# Patient Record
Sex: MALE | Race: OTHER | NOT HISPANIC OR LATINO | Employment: FULL TIME | URBAN - METROPOLITAN AREA
[De-identification: names, ages, dates, MRNs, and addresses within clinical notes are randomized per-mention and may not be internally consistent; named-entity substitution may affect disease eponyms.]

---

## 2021-03-25 ENCOUNTER — OCCMED (OUTPATIENT)
Dept: URGENT CARE | Facility: MEDICAL CENTER | Age: 46
End: 2021-03-25
Payer: OTHER MISCELLANEOUS
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
## 2021-03-25 PROCEDURE — 99283 EMERGENCY DEPT VISIT LOW MDM: CPT | Performed by: FAMILY MEDICINE

## 2021-03-25 PROCEDURE — G0382 LEV 3 HOSP TYPE B ED VISIT: HCPCS | Performed by: FAMILY MEDICINE

## 2021-03-26 NOTE — PROGRESS NOTES
[facility]        NAME: Liza Donald is a 48 y o  male  : 1975    MRN: [id number]  DATE: 2021  TIME: 9:24 PM    Assessment and Plan   No primary diagnosis found  No diagnosis found  Patient Instructions       Follow up with PCP in 3-5 days  Proceed to  ER if symptoms worsen  Chief Complaint   No chief complaint on file  History of Present Illness       HPI    Review of Systems   Review of Systems      Current Medications     No current outpatient medications on file  Current Allergies     Allergies as of 2021    (Not on File)            The following portions of the patient's history were reviewed and updated as appropriate: allergies, current medications, past family history, past medical history, past social history, past surgical history and problem list      No past medical history on file  No past surgical history on file  No family history on file  Medications have been verified  Objective   There were no vitals taken for this visit  No LMP for male patient         Physical Exam     Physical Exam

## 2021-04-21 ENCOUNTER — APPOINTMENT (OUTPATIENT)
Dept: RADIOLOGY | Facility: MEDICAL CENTER | Age: 46
End: 2021-04-21
Payer: OTHER MISCELLANEOUS

## 2021-04-21 VITALS
HEIGHT: 65 IN | DIASTOLIC BLOOD PRESSURE: 75 MMHG | WEIGHT: 167 LBS | HEART RATE: 64 BPM | BODY MASS INDEX: 27.82 KG/M2 | SYSTOLIC BLOOD PRESSURE: 119 MMHG

## 2021-04-21 DIAGNOSIS — T14.8XXA CRUSH INJURY: Primary | ICD-10-CM

## 2021-04-21 DIAGNOSIS — S61.209A OPEN DISLOCATION OF PROXIMAL INTERPHALANGEAL (PIP) JOINT OF FINGER: ICD-10-CM

## 2021-04-21 DIAGNOSIS — S61.214A LACERATION OF RIGHT RING FINGER WITHOUT FOREIGN BODY WITHOUT DAMAGE TO NAIL, INITIAL ENCOUNTER: ICD-10-CM

## 2021-04-21 DIAGNOSIS — S61.210A LACERATION OF RIGHT INDEX FINGER WITHOUT FOREIGN BODY WITHOUT DAMAGE TO NAIL, INITIAL ENCOUNTER: ICD-10-CM

## 2021-04-21 DIAGNOSIS — S63.289A OPEN DISLOCATION OF PROXIMAL INTERPHALANGEAL (PIP) JOINT OF FINGER: ICD-10-CM

## 2021-04-21 DIAGNOSIS — T14.8XXA CRUSH INJURY: ICD-10-CM

## 2021-04-21 PROCEDURE — 73140 X-RAY EXAM OF FINGER(S): CPT

## 2021-04-21 PROCEDURE — 99204 OFFICE O/P NEW MOD 45 MIN: CPT | Performed by: ORTHOPAEDIC SURGERY

## 2021-04-21 PROCEDURE — 64450 NJX AA&/STRD OTHER PN/BRANCH: CPT | Performed by: ORTHOPAEDIC SURGERY

## 2021-04-21 NOTE — PROGRESS NOTES
Chief Complaint     Right index, middle, and ring finger lacerations     History of Present Illness     Nahun Stack is a 48 y o  right hand dominant male who presents to the today for an evaluation of his right hand  This is a worker's compensation case  Patient works as a  and sustained injury on 03/17/2021 when his hand got caught in a metal roller  He was previously seen in the ED at [facility] where he was treated for a right middle finger PIP open dislocation as well as a laceration to the palmar aspect of his ring finger the PIP joint and a laceration to his index finger at palmar aspect at the distal phalanx  Patient was previously supposed to follow-up with a hand surgeon in an outside at work was unable to do so due to insurance  He has continued to follow-up with occupational medicine who has been treating him for his wounds  He did originally have sutures in but these were removed  He did  Receive 1 g of Ancef in the ED and was discharged with 1 week of Keflex which he has completed  Has been taking naproxen for pain  Patient states he has been dressing the wounds daily and using Neosporin about the lacerations  Patient notes diminished sensation to the distal tip of the middle finger as well as inability to flex his middle finger  He has not returned to work at this time  Patient previously sustained injury to the right hand many years with a laceration to the palm  He states he had no functional deficits from this injury  History reviewed  No pertinent past medical history  History reviewed  No pertinent surgical history  No Known Allergies    No current outpatient medications on file prior to visit  No current facility-administered medications on file prior to visit          Social History     Tobacco Use    Smoking status: Never Smoker    Smokeless tobacco: Never Used   Substance Use Topics    Alcohol use: Not Currently    Drug use: Not Currently       History reviewed  No pertinent family history  Review of Systems     As stated in the HPI  All other systems were reviewed and are negative  Physical Exam     /75   Pulse 64   Ht 5' 5" (1 651 m)   Wt 75 8 kg (167 lb)   BMI 27 79 kg/m²     GENERAL: This is a well-developed, well-nourished, age-appropriate patient in no acute distress  The patient is alert and oriented x3  Pleasant and cooperative  Eyes: Anicteric sclerae  Extraocular movements appear intact  HENT: Nares are patent with no drainage  Lungs: There is equal chest rise on inspection  Breathing is non-labored with no audible wheezing  Cardiovascular: No cyanosis  No upper extremity lymphadema  Skin: Skin is warm to touch  No obvious skin lesions or rashes other than described below  Neurologic: No ataxia  Psychiatric: Mood and affect are appropriate  Right hand   Well-healed laceration noted to the distal phalanx of the palmar aspect of the index finger   Laceration noted at the right finger at the palmar aspect just proximal to the PIP joint  Laceration noted at the index finger palmar aspect in line with the PIP joint  Old healed laceration inline with the index and middle at metcarpal heads at plamar aspect   No erythema, drainage, or ecchymosis noted  FDP and FDS intact to the index and ring   FDP intact to the middle, FDS intact with excursion after  Digital nerve block  No sensation to the radial middle finger, normal sensation to the ulnar side middle finger   Brisk capillary refill noted to all digits    Data Review     Labs:  None today     Electrodiagnostic Testing:  None today     Imaging:   x-rays of the right index, middle, and ring finger obtained on 04/21/2021 were personally reviewed by me in the office and demonstrate no acute fracture or osseous abnormality to the index or ring finger    There is slight dorsal  Angulation at the PIP joint of the middle finger,  But appropriate joint congruity and no fracture  Assessment and Plan      Diagnoses and all orders for this visit:    Crush injury  -     XR finger right third digit-middle; Future  -     XR finger right second digit-index; Future  -     XR finger right fourth digit-ring; Future  -     Ambulatory referral to PT/OT hand therapy; Future    Open dislocation of proximal interphalangeal (PIP) joint of finger  -     Nerve block  -     Ambulatory referral to PT/OT hand therapy; Future    Laceration of right index finger without foreign body without damage to nail, initial encounter    Laceration of right ring finger without foreign body without damage to nail, initial encounter    Other orders  -     Cancel: Hand/upper extremity injection          68-year-old male with open PIP joint dislocation right middle finger and right index and ring palmar lacerations  Due to concern for FDS integrity we dicussed digital block in the office today  This will better allow us to assess the tendon without pain  Patient tolerated the nerve block well  FDS was found to be intact  We discussed no surgical intervention is indicated  He should discontinue use of Neosporin but continued the laceration sites clean with soap and water daily  He may dressed the wounds in [place]  I would like him to see a hand therapist for monitoring of the wound as well as to help him regain function  I would like him to remain out of work at this time  We discussed even   After therapy he may have limitations achieving full flexion and full extension of the fingers however I do not feel this would be functionally limiting to him  In regards to the numbness and tingling to his middle finger this may never fully returned  We discussed the future surgical intervention may be warranted to address this but I would not recommend any surgery at this time  I will see him back in 4 weeks time for re-evaluation       Follow Up: 4 weeks     To Do Next Visit: range of motion check PROCEDURES PERFORMED:  Nerve block    Date/Time: 4/21/2021 3:31 PM  Performed by: Angela Mccrary MD  Authorized by: Angela Mccrary MD     Patient location:  [facility] Protocol:  Consent: Verbal consent obtained  Consent given by: patient      Indications:     Indications:  Pain relief  Location:     Body area:  Upper extremity    Upper extremity nerve:  Digital    Laterality:  Right  Pre-procedure details:     Skin preparation:  Alcohol  Skin anesthesia (see MAR for exact dosages):     Skin anesthesia method:  Local infiltration    Local anesthetic:  Lidocaine 1% w/o epi  Procedure details (see MAR for exact dosages):      Block needle gauge:  25 G    Steroid injected:  None    Additive injected:  None             Scribe Attestation    I,:  Jenny Morrison MA am acting as a scribe while in the presence of the attending physician :       I,:  Angela Mccrary MD personally performed the services described in this documentation    as scribed in my presence :

## 2021-04-21 NOTE — LETTER
April 21, 2021     Patient: Wayne Gunn   YOB: 1975   Date of Visit: 4/21/2021       To Whom it May Concern:    Wayne Gunn is under my professional care  He was seen in my office on 4/21/2021  He is to remain out of work at this time  He will be re-evaluated in 4 weeks  If you have any questions or concerns, please don't hesitate to call           Sincerely,          Alton Alfred MD        CC: No Recipients

## 2021-05-04 NOTE — TELEPHONE ENCOUNTER
Mima Chambers from [facility] is calling asking that I fax patients office visit note to [phone]  I did fax it but she also needs us to print & fax the OT script to her as well  Please print & fax to [phone]

## 2021-05-10 NOTE — TELEPHONE ENCOUNTER
Patient sees Dr Glenda Chopra    Patient is still out of work  Linda Perla from [employer] called to ask the doctor if the patient can return to work with restrictions  She states she can work with the employer to get the patient sedentary work or any other necessary work based on the restrictions from the doctor so he can return to his employer      Call back # [phone]

## 2021-05-10 NOTE — TELEPHONE ENCOUNTER
Tried to call patient to go over return to work but when VM picked up it stated it was "INGRID Short"  I did not leave a VM

## 2021-05-10 NOTE — TELEPHONE ENCOUNTER
Since not correct ID information, I did leave a vague voicemail for them to call back  Was going to explain to the patient that if he is interested we could have him return to work with no use of that hand, however, it would be perfectly reasonable to just keep him out until his follow up with us

## 2021-05-19 VITALS
WEIGHT: 167 LBS | SYSTOLIC BLOOD PRESSURE: 121 MMHG | DIASTOLIC BLOOD PRESSURE: 71 MMHG | HEART RATE: 76 BPM | BODY MASS INDEX: 27.82 KG/M2 | HEIGHT: 65 IN

## 2021-05-19 DIAGNOSIS — S63.289A OPEN DISLOCATION OF PROXIMAL INTERPHALANGEAL (PIP) JOINT OF FINGER: Primary | ICD-10-CM

## 2021-05-19 DIAGNOSIS — S61.210A LACERATION OF RIGHT INDEX FINGER WITHOUT FOREIGN BODY WITHOUT DAMAGE TO NAIL, INITIAL ENCOUNTER: ICD-10-CM

## 2021-05-19 DIAGNOSIS — S61.209A OPEN DISLOCATION OF PROXIMAL INTERPHALANGEAL (PIP) JOINT OF FINGER: Primary | ICD-10-CM

## 2021-05-19 DIAGNOSIS — S61.214A LACERATION OF RIGHT RING FINGER WITHOUT FOREIGN BODY WITHOUT DAMAGE TO NAIL, INITIAL ENCOUNTER: ICD-10-CM

## 2021-05-19 PROCEDURE — 99213 OFFICE O/P EST LOW 20 MIN: CPT | Performed by: ORTHOPAEDIC SURGERY

## 2021-05-19 NOTE — TELEPHONE ENCOUNTER
I rewrote the note to help clarify things and protect him  Please run it by the patient and let me or Dr Carl Hyatt know if any issues   Thanks

## 2021-05-19 NOTE — TELEPHONE ENCOUNTER
Patient is advising is he cant work with his right hand  He doesn't want them to push him to hard with the left hand he does not feel comfortable  He would like a note to be out of work if possible  Please call him [phone]   Thank you

## 2021-05-19 NOTE — PROGRESS NOTES
Chief Complaint     Right index, middle, and ring finger lacerations     History of Present Illness     Audi Santos is a 48 y o  right hand dominant male who presents to the office today for follow-up evaluation of his right hand  This is a continuation worker's compensation case  Patient sustained injury on 03/17/2021  He has been out work due to this injury  Patient states he continues to have hypersensitivity at the middle finger laceration as well as decreased sensation to the distal tip of the middle finger  particularly to the radial side  He notes no improvement with this numbness  He still is experiencing trouble with his range of motion  Patient states he has not initiated therapy until this last week  He notes no new injuries  He has not return to work due to his current injuries  History reviewed  No pertinent past medical history  History reviewed  No pertinent surgical history  No Known Allergies    No current outpatient medications on file prior to visit  No current facility-administered medications on file prior to visit  Social History     Tobacco Use    Smoking status: Never Smoker    Smokeless tobacco: Never Used   Substance Use Topics    Alcohol use: Not Currently    Drug use: Not Currently       History reviewed  No pertinent family history  Review of Systems     As stated in the HPI  All other systems were reviewed and are negative  Physical Exam     /71   Pulse 76   Ht 5' 5" (1 651 m)   Wt 75 8 kg (167 lb)   BMI 27 79 kg/m²     GENERAL: This is a well-developed, well-nourished, age-appropriate patient in no acute distress  The patient is alert and oriented x3  Pleasant and cooperative  Eyes: Anicteric sclerae  Extraocular movements appear intact  HENT: Nares are patent with no drainage  Lungs: There is equal chest rise on inspection  Breathing is non-labored with no audible wheezing  Cardiovascular: No cyanosis   No upper extremity lymphadema  Skin: Skin is warm to touch  No obvious skin lesions or rashes other than described below  Neurologic: No ataxia  Psychiatric: Mood and affect are appropriate  Right hand   Well-healed laceration to the index and ring finger  Laceration at the middle finger PIP joint appears healed with healthy granulation tissue seen under cutaneous level at the radial aspect  Patient with palpable scar tissue, no palpable foreign body  The scar is hypersensitive  Positive tinels at the laceration site of middle finger, leading edge of Tinels is 1-2 cm proximal to laceration  FDS and FDP intact to the index, middle, and ring  Flexion at the PIP of the middle finger is weak, but with encouragement patient does have a few degrees of motion here  [facility] a to the index finger is 3 centimeters, DPC to the middle finger is 4 5 centimeters, DPC to ring finger is 3 centimeters  Passive range of motion for index finger is 85 degrees at the PIP joint and 50 degrees at the DIP joint   Passive range of motion for the middle finger is 40 degrees at the PIP joint and 40 degrees of the DIP joint   Passive range of motion for the ring finger is 85 degrees at the PIP joint and 50 degrees at the DIP joint  2 point discrimination is 5 mm for the thumb, index, and small  Middle finger is greater than 15 mm at the radial aspect and 7 mm to the ulnar aspect  Ring finger is 7 mm to the radial aspect and 5 mm to the ulnar aspect  brisk capillary refill noted to all digits    Data Review     Labs:  None today     Electrodiagnostic Testing:  None today     Imaging:  None today     Assessment and Plan      Diagnoses and all orders for this visit:    Open dislocation of proximal interphalangeal (PIP) joint of finger  -     Ambulatory referral to PT/OT hand therapy; Future    Laceration of right index finger without foreign body without damage to nail, initial encounter  -     Ambulatory referral to PT/OT hand therapy;  Future    Laceration of right ring finger without foreign body without damage to nail, initial encounter  -     Ambulatory referral to PT/OT hand therapy; Future          25-year-old male s/p open dislocation to middle PIP joint and lacerations to the index and ring finger of the right hand  Patient and I discussed today based on his clinical exam that he does have integrity of his FDP and FDS tendons to the index, middle, and ring finger  We discussed that I do not feel that an ultrasound or MRI would be helpful as I am not concerned that his tendon is torn  We discussed in regards to the numbness he is feeling this is likely due to the injury and could take time to improve although it may never fully improve due to the severity of his injury  Because he has only completed 1 week of hand therapy we discussed the importance of continuing with formal therapy  I did advise him that I will be reaching out to the  [facility] on [address] [place] [address] to make sure he is seeing a certified hand therapist   I stressed to him the importance of working on his range of motion not only with hand therapy but on his own  I went over the details of having long-term motion issues if he does not perform the exercises  I have also recommended that he begin desensitization exercises in therapy  In regards to his work he may return to work with no use of his right hand  He will be seen back in the office in 4 weeks time for re-evaluation range-of-motion check  Update - I did contact [facility] and they stated he is being seen by [facility]      Follow Up:   Four weeks    To Do Next Visit:  Re-evaluation, range-of-motion check     PROCEDURES PERFORMED:  Procedures  No Procedures performed today

## 2021-05-19 NOTE — LETTER
May 19, 2021     Patient: Ana Bello   YOB: 1975   Date of Visit: 5/19/2021       To Whom it May Concern:    Ana Bello is under my professional care  He was seen in my office on 5/19/2021  He may return to work with no use of the right hand  If you have any questions or concerns, please don't hesitate to call           Sincerely,          Demetra Gutierrez MD        CC: No Recipients

## 2021-05-20 NOTE — TELEPHONE ENCOUNTER
Tried calling number on file, went to voicemail but the person that was speaking his name is Adriana Olvera, not sure is the correct patient's number

## 2021-06-16 ENCOUNTER — OFFICE VISIT (OUTPATIENT)
Dept: OBGYN CLINIC | Facility: MEDICAL CENTER | Age: 46
End: 2021-06-16
Payer: OTHER MISCELLANEOUS

## 2021-06-16 VITALS
HEIGHT: 65 IN | SYSTOLIC BLOOD PRESSURE: 110 MMHG | WEIGHT: 167 LBS | HEART RATE: 69 BPM | BODY MASS INDEX: 27.82 KG/M2 | DIASTOLIC BLOOD PRESSURE: 63 MMHG

## 2021-06-16 DIAGNOSIS — S61.209A OPEN DISLOCATION OF PROXIMAL INTERPHALANGEAL (PIP) JOINT OF FINGER: Primary | ICD-10-CM

## 2021-06-16 DIAGNOSIS — S63.289A OPEN DISLOCATION OF PROXIMAL INTERPHALANGEAL (PIP) JOINT OF FINGER: Primary | ICD-10-CM

## 2021-06-16 DIAGNOSIS — S61.210A LACERATION OF RIGHT INDEX FINGER WITHOUT FOREIGN BODY WITHOUT DAMAGE TO NAIL, INITIAL ENCOUNTER: ICD-10-CM

## 2021-06-16 DIAGNOSIS — S61.214A LACERATION OF RIGHT RING FINGER WITHOUT FOREIGN BODY WITHOUT DAMAGE TO NAIL, INITIAL ENCOUNTER: ICD-10-CM

## 2021-06-16 PROCEDURE — 99213 OFFICE O/P EST LOW 20 MIN: CPT | Performed by: ORTHOPAEDIC SURGERY

## 2021-06-16 NOTE — PROGRESS NOTES
Chief Complaint     Right index, middle and ring finger lacerations      History of Present Illness     Nahun Stack is a 48 y o  right hand dominant male   Returning for follow-up regarding his right hand lacerations  This is a continuation of worker's compensation case  Patient reports that he does note some improvement in his sensation although he does still suffer from tightness of the digits  He has been attending hand therapy three days a week and supplementing with home exercises  Patient was placed on restrictions of no use of the right hand while at work although they cannot accommodate this therefore he has been out of work  History reviewed  No pertinent past medical history  History reviewed  No pertinent surgical history  No Known Allergies    No current outpatient medications on file prior to visit  No current facility-administered medications on file prior to visit  Social History     Tobacco Use    Smoking status: Never Smoker    Smokeless tobacco: Never Used   Vaping Use    Vaping Use: Never used   Substance Use Topics    Alcohol use: Not Currently    Drug use: Not Currently       History reviewed  No pertinent family history  Review of Systems     As stated in the HPI  All other systems were reviewed and are negative  Physical Exam     /63   Pulse 69   Ht 5' 5" (1 651 m)   Wt 75 8 kg (167 lb)   BMI 27 79 kg/m²     GENERAL: This is a well-developed, well-nourished, age-appropriate patient in no acute distress  The patient is alert and oriented x3  Pleasant and cooperative  Eyes: Anicteric sclerae  Extraocular movements appear intact  HENT: Nares are patent with no drainage  Lungs: There is equal chest rise on inspection  Breathing is non-labored with no audible wheezing  Cardiovascular: No cyanosis  No upper extremity lymphadema  Skin: Skin is warm to touch  No obvious skin lesions or rashes other than described below    Neurologic: No ataxia  Psychiatric: Mood and affect are appropriate  Right hand:  Skin intact  No swelling, ecchymosis or erythema  Tinel's at scar on radial middle finger, scar is thickened  Joints glide well although tight  DPC 2 index, 3 middle, 1 5 ring, 1 5 small  No tenderness A1 pulleys  Neg intrinsic tightness test  Tenderness dorsal MCP joints  FDS and FDP all pull through  5/5 Motor to the APB, FDI, FDP2, FDP5, EDC  Sensation intact to light touch in the median, radial, and ulnar nerve distribution  2 pt: 5mm to all digits  Brisk capillary refill noted in all digits  Palpable distal radial pulse    Data Review     Labs:  None today    Electrodiagnostic Testing:  None today    Imaging:  None today     Assessment and Plan      Diagnoses and all orders for this visit:    Open dislocation of proximal interphalangeal (PIP) joint of finger  -     Ambulatory referral to PT/OT hand therapy; Future    Laceration of right index finger without foreign body without damage to nail, initial encounter  -     Ambulatory referral to PT/OT hand therapy; Future    Laceration of right ring finger without foreign body without damage to nail, initial encounter  -     Ambulatory referral to PT/OT hand therapy; Future       48year old male with right index, middle and ring finger lacerations with capsular tightness  I encouraged the patient that he has made great improvement since his last visit although he is still limited  He should continue and advance with therapy  I do not recommend surgery at this time given his progress thus far  He should continue with current restrictions of no use of right hand  Follow up in 4 weeks         Follow Up: 4 weeks    To Do Next Visit: repeat exam    PROCEDURES PERFORMED:    No Procedures performed today    Scribe Attestation    I,:  Chandu Grubbs MA am acting as a scribe while in the presence of the attending physician :       I,:  Valentin Santos MD personally performed the services described in this documentation    as scribed in my presence :

## 2021-06-16 NOTE — LETTER
June 16, 2021     Patient: Lucrecia Grider   YOB: 1975   Date of Visit: 6/16/2021       To Whom it May Concern:    Lucrecia Grider is under my professional care  He was seen in my office on 6/16/2021  He should continue with previously stated restrictions  Follow up 4 weeks  If you have any questions or concerns, please don't hesitate to call           Sincerely,          Shelley Silverio MD        CC: No Recipients

## 2021-06-28 NOTE — TELEPHONE ENCOUNTER
Enrique Diez from [email or web address] is calling about the form that she faxed over for Dr Carl Hyatt to fill out & send back to them  She is stating the middle section on the Work Status Report needs to be filled out better  One of the sections only show what patient can do with the left hand but does not state for the right hand  Please call her with any further questions at [phone]  It appears one copy is complete but she did not get the one with Dr Dimitri Perez by that section & dated 6/28/21

## 2021-06-28 NOTE — TELEPHONE ENCOUNTER
Faxed the notes from 6/28/21 which do have this section filled out to completion   If any more issues, let me know

## 2021-07-14 VITALS
WEIGHT: 167 LBS | DIASTOLIC BLOOD PRESSURE: 70 MMHG | SYSTOLIC BLOOD PRESSURE: 119 MMHG | HEART RATE: 59 BPM | BODY MASS INDEX: 27.82 KG/M2 | HEIGHT: 65 IN

## 2021-07-14 DIAGNOSIS — S61.210A LACERATION OF RIGHT INDEX FINGER WITHOUT FOREIGN BODY WITHOUT DAMAGE TO NAIL, INITIAL ENCOUNTER: Primary | ICD-10-CM

## 2021-07-14 DIAGNOSIS — S61.209A OPEN DISLOCATION OF PROXIMAL INTERPHALANGEAL (PIP) JOINT OF FINGER: ICD-10-CM

## 2021-07-14 DIAGNOSIS — S61.214A LACERATION OF RIGHT RING FINGER WITHOUT FOREIGN BODY WITHOUT DAMAGE TO NAIL, INITIAL ENCOUNTER: ICD-10-CM

## 2021-07-14 DIAGNOSIS — S63.289A OPEN DISLOCATION OF PROXIMAL INTERPHALANGEAL (PIP) JOINT OF FINGER: ICD-10-CM

## 2021-07-14 PROCEDURE — 99213 OFFICE O/P EST LOW 20 MIN: CPT | Performed by: ORTHOPAEDIC SURGERY

## 2021-07-14 NOTE — PROGRESS NOTES
Chief Complaint     Right index, middle and ring finger lacerations      History of Present Illness     Zheng Man is a 48 y o  right hand dominant male who presents today for follow up of right index, middle and ring finger lacerations  This is a continuation worker's compensation case  Patient sustained injury on 03/17/2021  Patient states he continues to go to therapy 3x/week  States that by the time he's done therapy, he can almost make a full fist, however, he'll wake up the next morning and have a lot of difficulty moving his fingers again  He states that this is primarily the middle finger that is bothersome  He also describes an occasional shock like pain in this finger whenever he taps it on a surface  Patient's job is a   History reviewed  No pertinent past medical history  History reviewed  No pertinent surgical history  No Known Allergies    No current outpatient medications on file prior to visit  No current facility-administered medications on file prior to visit  Social History     Tobacco Use    Smoking status: Never Smoker    Smokeless tobacco: Never Used   Vaping Use    Vaping Use: Never used   Substance Use Topics    Alcohol use: Not Currently    Drug use: Not Currently       History reviewed  No pertinent family history  Review of Systems     As stated in the HPI  All other systems were reviewed and are negative  Physical Exam     /70   Pulse 59   Ht 5' 5" (1 651 m)   Wt 75 8 kg (167 lb)   BMI 27 79 kg/m²     GENERAL: This is a well-developed, well-nourished, age-appropriate patient in no acute distress  The patient is alert and oriented x3  Pleasant and cooperative  Eyes: Anicteric sclerae  Extraocular movements appear intact  HENT: Nares are patent with no drainage  Lungs: There is equal chest rise on inspection  Breathing is non-labored with no audible wheezing  Cardiovascular: No cyanosis   No upper extremity lymphadema  Skin: Skin is warm to touch  No obvious skin lesions or rashes other than described below  Neurologic: No ataxia  Psychiatric: Mood and affect are appropriate  Right Upper Extremity:  No swelling, ecchymosis or erythema  Patient's laceration is well healed  Laceration along radial middle finger with some palpable scar tissue   + Tinel's along this laceration  No tenderness to palpation along the scar or flexor tendons/A1 pulleys  Patient has full extension, no MCP or PIP flexion contracture  DPC 2 index, 3 middle, 1 5 ring, 1 small - measured with ruler  FDS/FDP all pull through  Sensation intact to light touch both radial and ulnar index and ring fingers  Sensation slightly decreased dorsal branch radial digital nerve middle finger, intact ulnar middle finger dorsally and both palmar branches are full intact  Brisk capillary refill  Data Review     Labs:  None    Electrodiagnostic Testing:  None    Imaging:  None    Assessment and Plan      Diagnoses and all orders for this visit:    Laceration of right index finger without foreign body without damage to nail, initial encounter    Laceration of right ring finger without foreign body without damage to nail, initial encounter    Open dislocation of proximal interphalangeal (PIP) joint of finger       48 y o  right hand dominant male who presents today for follow up of right index, middle and ring finger lacerations from 03/17/2021  I discussed with the patient the fact that he is almost making a full fist after therapy is a good sign and that it is common to wake up and feel stiff again with finger injuries  Encouraged that this should continue to improve with time  In regards to the electric-sensation, this is likely related to the laceration on the side of this finger  I explained that sometimes neuromas can form after trauma which can cause this sensitivity   I discussed that this pain can last approximately 6 months from injury and that desensitization protocols can sometimes improve this  If this continues after 6 months, a small surgery may be beneficial with excising the neuroma and either burying or capping the nerve  In regards to his work, it seems as though patient's main concerns are motion and strength  I explained that at this time, I would like the patient to start using this hand at work, understanding he will still have things he can't do and will require assistance  I also discussed that patient's workan's compensation may at some point require him to get an NOEL for another opinion as to what he can/cannot do at work  All in all, I am recommending that patient continue with therapy and I do not believe any surgery is required at this time  Follow Up: 1 month with another physician  Patient advised to discuss my departure with his Workman's Compensation to determine where to go      To Do Next Visit: Reevaluate symptoms    PROCEDURES PERFORMED:  Procedures  No Procedures performed today

## 2021-07-14 NOTE — LETTER
July 14, 2021     Patient: Joanie Byrd   YOB: 1975   Date of Visit: 7/14/2021       To Whom it May Concern:    Joanie Byrd is under my professional care  He was seen in my office on 7/14/2021  He can work with the following restrictions: 20lb weight restriction, patient can start to use the right hand  If you have any questions or concerns, please don't hesitate to call           Sincerely,          Juanita Graham MD        CC: No Recipients

## 2021-07-26 NOTE — TELEPHONE ENCOUNTER
Yvonne Dunne from [facility] is calling about the plan of care that they faxed to Dr Joshua Valdovinos  I did refax it to her at [phone]

## 2021-08-11 VITALS
WEIGHT: 167 LBS | DIASTOLIC BLOOD PRESSURE: 69 MMHG | HEART RATE: 56 BPM | HEIGHT: 65 IN | BODY MASS INDEX: 27.82 KG/M2 | SYSTOLIC BLOOD PRESSURE: 112 MMHG

## 2021-08-11 DIAGNOSIS — S61.209A OPEN DISLOCATION OF PROXIMAL INTERPHALANGEAL (PIP) JOINT OF FINGER: Primary | ICD-10-CM

## 2021-08-11 DIAGNOSIS — S61.210A LACERATION OF RIGHT INDEX FINGER WITHOUT FOREIGN BODY WITHOUT DAMAGE TO NAIL, INITIAL ENCOUNTER: ICD-10-CM

## 2021-08-11 DIAGNOSIS — S63.289A OPEN DISLOCATION OF PROXIMAL INTERPHALANGEAL (PIP) JOINT OF FINGER: Primary | ICD-10-CM

## 2021-08-11 PROCEDURE — 99213 OFFICE O/P EST LOW 20 MIN: CPT | Performed by: ORTHOPAEDIC SURGERY

## 2021-08-11 NOTE — PROGRESS NOTES
Chief Complaint     Right hand pain and stiffness      History of Present Illness     Sanaz Wahl is a 48 y o  male who presents status post right hand laceration sustained at work  This is a worker's compensation case  Patient has persisted with therapy  He is making gains with his flexion but he still has pain in right middle finger  He also has numbness on the dorsal radial aspect of the middle finger though the numbness is not as bothersome as the pain associated with the laceration on the middle finger itself  He notes that he is better after therapy but then he gets stiff again  Heat definitely improved his range of motion  He is back to work with a 20 lb restriction    History reviewed  No pertinent past medical history  History reviewed  No pertinent surgical history  No Known Allergies    No current outpatient medications on file prior to visit  No current facility-administered medications on file prior to visit  Social History     Tobacco Use    Smoking status: Never Smoker    Smokeless tobacco: Never Used   Vaping Use    Vaping Use: Never used   Substance Use Topics    Alcohol use: Not Currently    Drug use: Not Currently       History reviewed  No pertinent family history  Review of Systems     As stated in the HPI  All other systems were reviewed and are negative  Physical Exam     /69   Pulse 56   Ht 5' 5" (1 651 m)   Wt 75 8 kg (167 lb)   BMI 27 79 kg/m²     GENERAL: This is a well-developed, well-nourished, age-appropriate patient in no acute distress  The patient is alert and oriented x3  Pleasant and cooperative  Eyes: Anicteric sclerae  Extraocular movements appear intact  HENT: Nares are patent with no drainage  Lungs: There is equal chest rise on inspection  Breathing is non-labored with no audible wheezing  Cardiovascular: No cyanosis  No upper extremity lymphadema  Skin: Skin is warm to touch   No obvious skin lesions or rashes other than described below  Neurologic: No ataxia  Psychiatric: Mood and affect are appropriate  Examination right upper extremity reveals well-healing lacerations to the palmar hand as well as the radial ring middle finger  These are maturing and softening  He has range of motion that is improved  His ring and small finger with DPC of 0 and his index and middle finger have a DPC of 1 and 2 cm respectively both measured with rulers  Sensation intact to light touch in the radial ulnar aspect palmarly on all fingers and dorsally everywhere as well except for the dorsal radial aspect of the middle finger  He has a positive Tinel's at shoots to the dorsal radial aspect of the middle finger at the site of laceration adjacent to the PIP joint  He has no Tinel's radiating to the palmar aspect of the middle finger  Brisk capillary refill to all digits  Data Review     Labs:  None today    Electrodiagnostic Testing:  None today    Imaging:  None today    Patient notes that in therapy he was able to  up to 28 lb with a goniometer    Assessment and Plan      There are no diagnoses linked to this encounter  40-year-old male with a healing laceration and significant stiffness to the right hand as well as a dorsal radial digital nerve branch injury  Discussed that his pain on the dorsal radial aspect of the middle finger is related to a 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  He likely has complete transection of this as well as neuroma formation which is contributing to pain  The digital nerve palmarly does appear to be intact however  I discussed with him that at this point, surgery could be performed for neuroma pain  We discussed continuing scar massage alone verses the surgery with the risks and benefits associated with both options    Discussed that grafting could be a possible option though I do not expect him to really regain much sensation with the surgery and the surgery would be mostly for pain  Patient has good understanding  He would like to proceed with that surgery  I also discussed the stiffness that he has  He continues to make gains every single time I see him  He is a cm better in terms of flexion with the index and middle finger and now has full flexion with the ring and small finger  I do think that he will continue with improvement with further therapy  It does seem like a long course for him, but I did discuss that this does take quite a long period of time given the extent of his injury  I also discussed that flexor tenolysis and manipulation of the PIP joints could be beneficial but I really would recommend going after this right this 2nd because he still is showing improvement  Given all this, I have recommended no surgery at this current moment but in the future, but potentially even after just 1 more month of therapy, he could be a candidate for both surgeries or just the neuroma surgery if this is still bothersome for him  I am leaving this practice and recommended that he follow-up with 1 of my partners or different surgeon that his worker's compensation will accommodate him for  Continue restrictions with upgrade to 30 lb at work    Patient not at [address] [place] [address]      Follow Up:  1 month    To Do Next Visit:     PROCEDURES PERFORMED:  Procedures  No Procedures performed today

## 2021-08-11 NOTE — LETTER
August 11, 2021     Patient: Sabina Muñoz   YOB: 1975   Date of Visit: 8/11/2021       To Whom it May Concern:    Sabina Muñoz is under my professional care  He was seen in my office on 8/11/2021  He  can work with the following restrictions: 30lb weight restriction to the right hand  If you have any questions or concerns, please don't hesitate to call           Sincerely,          Almaz Sprague MD        CC: No Recipients

## 2021-08-27 NOTE — TELEPHONE ENCOUNTER
Ovi calling from [facility] about the protocol, she will be faxing a request to the [facility] office, she states she did not   Receive the fax from 7/26/2021